# Patient Record
Sex: FEMALE | Race: WHITE | HISPANIC OR LATINO | Employment: FULL TIME | URBAN - METROPOLITAN AREA
[De-identification: names, ages, dates, MRNs, and addresses within clinical notes are randomized per-mention and may not be internally consistent; named-entity substitution may affect disease eponyms.]

---

## 2017-02-09 ENCOUNTER — OFFICE VISIT (OUTPATIENT)
Dept: NEUROLOGY | Facility: MEDICAL CENTER | Age: 52
End: 2017-02-09
Payer: COMMERCIAL

## 2017-02-09 VITALS
TEMPERATURE: 97.4 F | DIASTOLIC BLOOD PRESSURE: 76 MMHG | BODY MASS INDEX: 32.15 KG/M2 | RESPIRATION RATE: 16 BRPM | WEIGHT: 193 LBS | HEART RATE: 78 BPM | SYSTOLIC BLOOD PRESSURE: 126 MMHG | HEIGHT: 65 IN | OXYGEN SATURATION: 96 %

## 2017-02-09 DIAGNOSIS — M79.605 LUMBAR PAIN WITH RADIATION DOWN BOTH LEGS: ICD-10-CM

## 2017-02-09 DIAGNOSIS — M54.50 LUMBAR PAIN WITH RADIATION DOWN BOTH LEGS: ICD-10-CM

## 2017-02-09 DIAGNOSIS — M79.604 LUMBAR PAIN WITH RADIATION DOWN BOTH LEGS: ICD-10-CM

## 2017-02-09 PROCEDURE — 99213 OFFICE O/P EST LOW 20 MIN: CPT

## 2017-02-09 NOTE — MR AVS SNAPSHOT
"Margot Turk   2017 4:40 PM   Office Visit   MRN: [id number]    Department:  [facility]   Dept Phone:  [phone]    Description:  Female : 1965   Provider:  Magdi Sexton M.D.           Reason for Visit     Follow-Up lumbar pain      Allergies as of 2017     No Known Allergies      You were diagnosed with     Lumbar pain with radiation down both legs   [[id number]]         Vital Signs     Blood Pressure Pulse Temperature Respirations Height Weight    126/76 mmHg 78 36.3 °C (97.4 °F) 16 1.651 m (5' 5\") 87.544 kg (193 lb)    Body Mass Index Oxygen Saturation Smoking Status             32.12 kg/m2 96% Never Smoker          Basic Information     Date Of Birth Sex Race Ethnicity Preferred Language    1965 Female  or   Origin (Slovak,Omani,Iranian,Kenyan, etc) English      Your appointments     May 11, 2017  4:40 PM   Follow Up Visit with Magdi Sexton M.D.   [facility] (--)    [address] [place] [address] [address]  [facility] [id number] [phone]           You will be receiving a confirmation call a few days before your appointment from our automated call confirmation system.              Problem List              ICD-10-CM Priority Class Noted - Resolved    Obesity (BMI 30-39.9) E66.9   2015 - Present    Cirrhosis (CMS-HCC) K74.60   2015 - Present    Chronic pain G89.29   2015 - Present    Chronic left shoulder pain M25.512, G89.29   2015 - Present    Vitamin D deficiency E55.9   2015 - Present    Lipoma D17.9   2015 - Present    HOGUE (nonalcoholic steatohepatitis) K75.81   2016 - Present      Health Maintenance        Date Due Completion Dates    IMM DTaP/Tdap/Td Vaccine (1 - Tdap) 1985 ---    IMM PNEUMOCOCCAL 19-64 (ADULT) MEDIUM RISK SERIES (1 of 1 - PPSV23) 1985 ---    MAMMOGRAM 2016 (Done)    Override on 2015: Done (nl; nl hx)    IMM INFLUENZA (1) 2016 ---  "    COLONOSCOPY 9/19/2016 ---    PAP SMEAR 6/1/2018 6/1/2015 (Done)    Override on 6/1/2015: Done (nl; nl hx)            Current Immunizations     No immunizations on file.      Below and/or attached are the medications your provider expects you to take. Review all of your home medications and newly ordered medications with your provider and/or pharmacist. Follow medication instructions as directed by your provider and/or pharmacist. Please keep your medication list with you and share with your provider. Update the information when medications are discontinued, doses are changed, or new medications (including over-the-counter products) are added; and carry medication information at all times in the event of emergency situations     Allergies:  No Known Allergies          Medications  Valid as of: February 09, 2017 -  4:52 PM    Generic Name Brand Name Tablet Size Instructions for use    Gabapentin (Tab) NEURONTIN 600 MG Take 1 Tab by mouth 3 times a day.        Naproxen Sodium (Cap) Naproxen Sodium 220 MG Take  by mouth.        .                 Medicines prescribed today were sent to:     [facility] [address] [place] [address] [address] [facility] [address]    Phone: [phone] Fax: [phone]    Open 24 Hours?: Yes      Medication refill instructions:       If your prescription bottle indicates you have medication refills left, it is not necessary to call your provider’s office. Please contact your pharmacy and they will refill your medication.    If your prescription bottle indicates you do not have any refills left, you may request refills at any time through one of the following ways: The online [employer] system (except Urgent Care), by calling your provider’s office, or by asking your pharmacy to contact your provider’s office with a refill request. Medication refills are processed only during regular business hours and may not be available until the next  business day. Your provider may request additional information or to have a follow-up visit with you prior to refilling your medication.   *Please Note: Medication refills are assigned a new Rx number when refilled electronically. Your pharmacy may indicate that no refills were authorized even though a new prescription for the same medication is available at the pharmacy. Please request the medicine by name with the pharmacy before contacting your provider for a refill.           [employer] Access Code: [email or web address]  Expires: 3/11/2017  4:32 PM    [employer] [facility]  A secure, online tool to manage your health information     [employer]’s [employer]® is a secure, online tool that connects you to your personalized health information from the privacy of your home -- day or night - making it very easy for you to manage your healthcare. Once the activation process is completed, you can even access your medical information using the [employer] kinjal, which is available for free in the Apple Kinjal store or Google Play store.     [employer] [facility] provides the following levels of access (as shown below):   My Chart Features   [facility] Doctor [facility]  Specialists [facility]  Urgent  Care Non-[facility]  Primary Care  Doctor   Email your healthcare team securely and privately 24/7 X X X    Manage appointments: schedule your next appointment; view details of past/upcoming appointments X      Request prescription refills. X      View recent personal medical records, including lab and immunizations X X X X   View health record, including health history, allergies, medications X X X X   Read reports about your outpatient visits, procedures, consult and ER notes X X X X   See your discharge summary, which is a recap of your hospital and/or ER visit that includes your diagnosis, lab results, and care plan. X X       How to register for [employer]:  1. Go to  [email or web address].  2. Click on the Sign Up Now box, which takes you to the New Member Sign  Up page. You will need to provide the following information:  a. Enter your [employer] Access Code exactly as it appears at the top of this page. (You will not need to use this code after you’ve completed the sign-up process. If you do not sign up before the expiration date, you must request a new code.)   b. Enter your date of birth.   c. Enter your home email address.   d. Click Submit, and follow the next screen’s instructions.  3. Create a [employer] ID. This will be your [employer] login ID and cannot be changed, so think of one that is secure and easy to remember.  4. Create a [employer] password. You can change your password at any time.  5. Enter your Password Reset Question and Answer. This can be used at a later time if you forget your password.   6. Enter your e-mail address. This allows you to receive e-mail notifications when new information is available in [employer].  7. Click Sign Up. You can now view your health information.    For assistance activating your [employer] account, call [phone]

## 2017-02-10 NOTE — PROGRESS NOTES
Neurology Progress Note  2/9/2017      Referring MD:  Dr. Avery  Patient ID:  Name:             Margot Turk     YOB: 1965  Age:                 51 y.o.  female   MRN:               [id number]                                              Reason for Consult:      Follow-up: Back pain with radicular symptoms.    History of Present Illness:    This 51-year-old  lady was thought to have an arteriovenous malformation of the lumbar and lower thoracic cord but that could not be proved by angiography. She has significant back pain which is impairing her work. Initially thought she might have had a spinal stenosis syndrome but I reviewed the films that were not available at the last visit and it does not appear to be a significant spinal stenosis syndrome. He was started on gabapentin 600 mg which she takes when she is not working as it makes her relaxed and sleepy. However her pain has diminished considerably and she seems to be happy with the result at this point.    Review of Systems: Relates to her back pain.  Constitutional: Denies fevers, Denies weight changes  Eyes: Denies changes in vision, no eye pain  Ears/Nose/Throat/Mouth: Denies nasal congestion or sore throat   Cardiovascular: Denies chest pain, Denies palpitations   Respiratory: Denies shortness of breath , Denies cough  Gastrointestinal/Hepatic: Denies abdominal pain, nausea, vomiting, diarrhea, constipation or GI bleeding   Genitourinary: Denies dysuria or frequency  Musculoskeletal/Rheum: Denies  joint pain and swelling, No edema  Skin: Denies rash  Neurological: Denies headache, confusion, memory loss or focal weakness/parasthesias  Psychiatric: denies mood disorder   Endocrine: Britta thyroid problems  Heme/Oncology/Lymph Nodes: Denies enlarged lymph nodes, denies brusing or known bleeding disorder  All other systems were reviewed and are negative (AMA/CMS criteria)                Past Medical History:     Past Medical  "History   Diagnosis Date   • Cirrhosis    • Dental disorder      partial upper and lower   • Heart burn      back       Problems addressed this visit:    Problem List Items Addressed This Visit     None      Visit Diagnoses     Lumbar pain with radiation down both legs               Past Surgical History:   Past Surgical History   Procedure Laterality Date   • Pelviscopy       infertility   • Recovery  9/1/2016     Procedure: [id number]. RANGASWAMY-ANESTHESIA;  Surgeon: Ir-Recovery Surgery;  Location: SURGERY [facility];  Service:          Current Outpatient Medications:  Current Outpatient Prescriptions   Medication   • gabapentin (NEURONTIN) 600 MG tablet   • Naproxen Sodium (ALEVE) 220 MG Cap     No current facility-administered medications for this visit.       Medication Allergy:  No Known Allergies    Family History:  Family History   Problem Relation Age of Onset   • Other Father      kidney problem   • Cancer Neg Hx    • Diabetes Neg Hx        Social History:  Social History     Social History   • Marital Status: Single     Spouse Name: N/A   • Number of Children: N/A   • Years of Education: N/A     Occupational History   • Not on file.     Social History Main Topics   • Smoking status: Never Smoker    • Smokeless tobacco: Never Used   • Alcohol Use: No   • Drug Use: No   • Sexual Activity: Not Currently     Birth Control/ Protection: Post-Menopausal     Other Topics Concern   • Not on file     Social History Narrative       Physical Exam:  Vitals:   Filed Vitals:    02/09/17 1635   BP: 126/76   Pulse: 78   Temp: 36.3 °C (97.4 °F)   Resp: 16   Height: 1.651 m (5' 5\")   Weight: 87.544 kg (193 lb)   SpO2: 96%     General Appearance: This very pleasant  lady and exam was conducted with the help of a . She thinks that she can get by with just the gabapentin initially and even when I offered a muscle relaxant she didn't feel that that was necessary. Her back is not particularly " tender at this point and her exam shows normal strength and reflexes and sensation in the legs.  Weight/BMI: Body mass index is 32.12 kg/(m^2).      Eyes:  Normal optic discs: Yes  Visual field: Normal  Pupillary responses: Normal  Extraocular movement: Normal    Cardiovascular:  Normal carotid pulses bilaterally: Yes  RRR with no MRGs: Yes  No peripheral edema, pulses intact: Yes    Musculoskeletal:  Normal gait and station: Yes  Normal muscle strength: Yes  Normal muscle tone, no atrophy: Yes  No abnormal movements: Yes    Plan:   Since she is happy with the result able to work and not unduly sedated by the medication will keep her on the same medication for now on plan on seeing her in 3 months.    Total length of time of this visit was 20 minutes of which greater than 50% was spent counseling the patient/family and coordinating care.    Thank you for allowing me to participate in his care.    Sincerely yours,        Magdi Sexton MD, PhD

## 2017-05-11 ENCOUNTER — OFFICE VISIT (OUTPATIENT)
Dept: NEUROLOGY | Facility: MEDICAL CENTER | Age: 52
End: 2017-05-11
Payer: COMMERCIAL

## 2017-05-11 VITALS
BODY MASS INDEX: 31.99 KG/M2 | WEIGHT: 192 LBS | HEART RATE: 64 BPM | DIASTOLIC BLOOD PRESSURE: 76 MMHG | TEMPERATURE: 97.9 F | OXYGEN SATURATION: 98 % | HEIGHT: 65 IN | SYSTOLIC BLOOD PRESSURE: 120 MMHG

## 2017-05-11 DIAGNOSIS — M79.604 LUMBAR PAIN WITH RADIATION DOWN BOTH LEGS: ICD-10-CM

## 2017-05-11 DIAGNOSIS — M79.605 LUMBAR PAIN WITH RADIATION DOWN BOTH LEGS: ICD-10-CM

## 2017-05-11 DIAGNOSIS — M54.50 LUMBAR PAIN WITH RADIATION DOWN BOTH LEGS: ICD-10-CM

## 2017-05-11 PROCEDURE — 99213 OFFICE O/P EST LOW 20 MIN: CPT

## 2017-05-11 NOTE — PROGRESS NOTES
Neurology Progress Note  5/11/2017      Referring MD:  Dr. Aye Avery    Patient ID:  Name:             Margot Turk     YOB: 1965  Age:                 51 y.o.  female   MRN:               [id number]                                              Reason for Consult:      Lumbar pain    History of Present Illness:    This 51-year-old  lady was initially thought to have a significant arteriovenous malformation in the lower thoracic and lumbar area on MRI studies done at another institution. She was transferred to this institution for possible embolization of the presumed arteriovenous malformation but on injection of the presumed feeding artery no arteriovenous malformation was found. Review of the MRI side institution shows an area of tissue and lumbar region that could look like an arterial venous malformation but could also represent fatty tissue. After the procedure was terminated she was seen here and started on gabapentin to control her disc discomfort she had and after several months she has no pain at this time except when she turns to her right and she has some discomfort in the paraspinal muscles in the lower thoracic region.    Review of Systems: Currently unremarkable.  Constitutional: Denies fevers, Denies weight changes  Eyes: Denies changes in vision, no eye pain  Ears/Nose/Throat/Mouth: Denies nasal congestion or sore throat   Cardiovascular: Denies chest pain, Denies palpitations   Respiratory: Denies shortness of breath , Denies cough  Gastrointestinal/Hepatic: Denies abdominal pain, nausea, vomiting, diarrhea, constipation or GI bleeding   Genitourinary: Denies dysuria or frequency  Musculoskeletal/Rheum: Denies  joint pain and swelling, No edema  Skin: Denies rash  Neurological: Denies headache, confusion, memory loss or focal weakness/parasthesias  Psychiatric: denies mood disorder   Endocrine: Britta thyroid problems  Heme/Oncology/Lymph Nodes: Denies enlarged  "lymph nodes, denies brusing or known bleeding disorder  All other systems were reviewed and are negative (AMA/CMS criteria)                Past Medical History:     Past Medical History   Diagnosis Date   • Cirrhosis    • Dental disorder      partial upper and lower   • Heart burn      back       Problems addressed this visit:    Problem List Items Addressed This Visit     None      Visit Diagnoses     Lumbar pain with radiation down both legs               Past Surgical History:   Past Surgical History   Procedure Laterality Date   • Pelviscopy       infertility   • Recovery  9/1/2016     Procedure: [id number]. RANGASWAMY-ANESTHESIA;  Surgeon: Ir-Recovery Surgery;  Location: SURGERY [facility];  Service:          Current Outpatient Medications:  Current Outpatient Prescriptions   Medication   • gabapentin (NEURONTIN) 600 MG tablet   • Naproxen Sodium (ALEVE) 220 MG Cap     No current facility-administered medications for this visit.       Medication Allergy:  No Known Allergies    Family History:  Family History   Problem Relation Age of Onset   • Other Father      kidney problem   • Cancer Neg Hx    • Diabetes Neg Hx        Social History:  Social History     Social History   • Marital Status: Single     Spouse Name: N/A   • Number of Children: N/A   • Years of Education: N/A     Occupational History   • Not on file.     Social History Main Topics   • Smoking status: Never Smoker    • Smokeless tobacco: Never Used   • Alcohol Use: No   • Drug Use: No   • Sexual Activity: Not Currently     Birth Control/ Protection: Post-Menopausal     Other Topics Concern   • Not on file     Social History Narrative       Physical Exam:  Vitals:   Filed Vitals:    05/11/17 1621   BP: 120/76   Pulse: 64   Temp: 36.6 °C (97.9 °F)   Height: 1.651 m (5' 5\")   Weight: 87.091 kg (192 lb)   SpO2: 98%     General Appearance: He is well-developed well-nourished  lady who is quite pleasant. Examining the lower thoracic " region does reveal an area in the muscle it's tender but not obviously inflamed and feels more like a muscle spasm or fibromyalgia-like area and does not involve the lumbar spine area which appears to be normal on palpation. Her lower extremity exam is unremarkable as is the rest of her neurological exam.  Weight/BMI: Body mass index is 31.95 kg/(m^2).      Eyes:  Normal optic discs: Yes  Visual field: Normal  Pupillary responses: Normal  Extraocular movement: Normal    Cardiovascular:  Normal carotid pulses bilaterally: Yes  RRR with no MRGs: Yes  No peripheral edema, pulses intact: Yes    Musculoskeletal:  Normal gait and station: Yes  Normal muscle strength: Yes  Normal muscle tone, no atrophy: Yes  No abnormal movements: Yes    Plan:   In reviewing the MRI with her shoulder the area of concern and the fact that that was evaluated by angiographic technique and found not to be an arteriovenous malformation and probably represents some fatty tissue in the area. She is happy with the result except for the cost that she had cover a portion of the very expensive procedure. The localized area could probably be treated with physical means such as heat massage possibly trigger point injections could be arranged by her family physician. We'll see her again if needed if further problems arise she should call us.    Total length of time of this visit was 20 minutes of which greater than 50% was spent counseling the patient/family and coordinating care.    Thank you for allowing me to participate in his care.    Sincerely yours,        Magdi Sexton MD, PhD

## 2018-12-05 ENCOUNTER — HOSPITAL ENCOUNTER (EMERGENCY)
Dept: HOSPITAL 8 - ED | Age: 53
Discharge: HOME | End: 2018-12-05
Payer: COMMERCIAL

## 2018-12-05 VITALS — DIASTOLIC BLOOD PRESSURE: 95 MMHG | SYSTOLIC BLOOD PRESSURE: 149 MMHG

## 2018-12-05 VITALS — WEIGHT: 201.28 LBS | BODY MASS INDEX: 39.52 KG/M2 | HEIGHT: 60 IN

## 2018-12-05 DIAGNOSIS — S61.217A: Primary | ICD-10-CM

## 2018-12-05 PROCEDURE — 99281 EMR DPT VST MAYX REQ PHY/QHP: CPT

## 2019-03-12 ENCOUNTER — OFFICE VISIT (OUTPATIENT)
Dept: MEDICAL GROUP | Facility: MEDICAL CENTER | Age: 54
End: 2019-03-12
Payer: COMMERCIAL

## 2019-03-12 VITALS
SYSTOLIC BLOOD PRESSURE: 138 MMHG | RESPIRATION RATE: 14 BRPM | BODY MASS INDEX: 34.23 KG/M2 | TEMPERATURE: 97.6 F | OXYGEN SATURATION: 97 % | HEIGHT: 65 IN | HEART RATE: 82 BPM | DIASTOLIC BLOOD PRESSURE: 74 MMHG | WEIGHT: 205.47 LBS

## 2019-03-12 DIAGNOSIS — Z13.220 SCREENING, LIPID: ICD-10-CM

## 2019-03-12 DIAGNOSIS — Z13.29 SCREENING FOR THYROID DISORDER: ICD-10-CM

## 2019-03-12 DIAGNOSIS — E55.9 VITAMIN D DEFICIENCY: ICD-10-CM

## 2019-03-12 DIAGNOSIS — Z12.31 ENCOUNTER FOR SCREENING MAMMOGRAM FOR BREAST CANCER: ICD-10-CM

## 2019-03-12 DIAGNOSIS — K74.60 CIRRHOSIS OF LIVER WITHOUT ASCITES, UNSPECIFIED HEPATIC CIRRHOSIS TYPE (HCC): ICD-10-CM

## 2019-03-12 DIAGNOSIS — K75.81 NASH (NONALCOHOLIC STEATOHEPATITIS): ICD-10-CM

## 2019-03-12 DIAGNOSIS — Z12.11 COLON CANCER SCREENING: ICD-10-CM

## 2019-03-12 DIAGNOSIS — Z28.21 INFLUENZA VACCINATION DECLINED: ICD-10-CM

## 2019-03-12 DIAGNOSIS — R20.0 NUMBNESS OF FINGER: ICD-10-CM

## 2019-03-12 PROCEDURE — 99214 OFFICE O/P EST MOD 30 MIN: CPT | Performed by: NURSE PRACTITIONER

## 2019-03-12 ASSESSMENT — PATIENT HEALTH QUESTIONNAIRE - PHQ9: CLINICAL INTERPRETATION OF PHQ2 SCORE: 0

## 2019-03-12 NOTE — LETTER
March 12, 2019         Patient: Margot Turk   YOB: 1965   Date of Visit: 3/12/2019           To Whom it May Concern:    Margot Turk was seen in my clinic on 3/12/2019. She continues to have numbness in her LEFT pinky finger that began and has persisted from her finger laceration 11/26/19.  She has no other symptoms that would be concerning for an underlying chronic illness causing this symptom.    If you have any questions or concerns, please don't hesitate to call.        Sincerely,           DICK Mazariegos  Electronically Signed

## 2019-03-12 NOTE — PROGRESS NOTES
"CC: Hand Numbness (related to work injury 11/26/18. After getting 3 stitches, she still has numbness. Work comp sent to her PCP to determine if the numbness is underlying condition or due to work comp)        HPI:     Margot presents today for the following:    Last seen approximately 3 years ago-denies any changes in her health since then      1. Numbness of finger  Here primarily to follow-up regarding some numbness on the distal aspect of her right pinky finger.  States on November 26 she had a deep laceration.  Sutures were placed in the [facility] ER.  She went back to days later and had them removed.  States there was a thick scar which she \"cut off\" afterwards.  She states ever since she was in the ER she is been unable to have normal sensation on the distal aspect of this finger.  States she can tell something is touching it but it sort of numb and vague feeling.  [facility] however per the paperwork she brought in today there was a comment that she was there related to complaints of chest pain when her pinky finger was touched and they wanted her to be seen by PCP to rule out other etiology.  Patient denies actual chest pain.  States when something touches the finger which is not necessarily painful just very bothersome she will have a sensation of transient chest pain.  Suspect this is more of a reaction to pain.  She states she does not feel pain that moves from the finger up into the arm.  It is localized numbness and discomfort.    Per paperwork they did address Tdap in the ER    2. Cirrhosis of liver without ascites, unspecified hepatic cirrhosis type (HCC)/HOGUE (nonalcoholic steatohepatitis)  Patient states she last saw Dr. Hyde may be 2 or 3 years ago.  She states she had a liver biopsy which confirmed she did not have cirrhosis however she did have [place].  Describes \"inflammation from fat in the liver\".  She is been lost to follow-up.    4. Vitamin D deficiency  History of " "vitamin D deficiency.  Not currently taking vitamins for it        No current outpatient prescriptions on file.     No current facility-administered medications for this visit.      Social History   Substance Use Topics   • Smoking status: Never Smoker   • Smokeless tobacco: Never Used   • Alcohol use No     I reviewed patients allergies, problem list and medications today in EPIC.    ROS: Any/all pertinent positives listed in the HPI, otherwise all others reviewed are negative today.      /74 (BP Location: Right arm, Patient Position: Sitting, BP Cuff Size: Adult)   Pulse 82   Temp 36.4 °C (97.6 °F) (Temporal)   Resp 14   Ht 1.651 m (5' 5\")   Wt 93.2 kg (205 lb 7.5 oz)   SpO2 97%   BMI 34.19 kg/m²     Exam:    Gen: Alert and oriented, No apparent distress. WDWN  Psych: A+Ox3, normal affect and mood  Skin: Warm, dry and intact. Good turgor   No rashes in visible areas.  Eye: Conjunctiva clear, lids normal  ENMT: Lips without lesions, fair dentition.   Neck: No Lymphadenopathy, Thyromegaly, Bruits.   Trachea midline, no masses  Lungs: Clear to auscultation bilaterally, no rales or rhonchi   Unlabored respiratory effort.   CV: Regular rate and rhythm, S1, S2. No murmurs.   No Edema  Right hand pinky finger shows no visible abnormality.  She does report numbness from the PIP distally down to the fingertip.  Normal at the base of the pinky.  Laceration was around the PIP.      Assessment and Plan.   53 y.o. female with the following issues.    1. Numbness of finger  Stable.  Old well-healed incision with no visible scar abnormality.  She does have localized numbness that both began and persisted since she had a cut on 11/26.  Describes it as being specifically from the PIP down to the fingertip.  Does not have any associated hand, wrist, arm, neck discomfort.  Note given to bring to Workmen's Comp.    2. Cirrhosis of liver without ascites, unspecified hepatic cirrhosis type (HCC)/ HOGUE (nonalcoholic " steatohepatitis)  Per patient cirrhosis is no longer an active diagnoses.  I will request records and adjust her problem list at that time.  When I have records I will be able to determine if she needs to have continued follow-up.  Labs ordered as below.  - Comp Metabolic Panel; Future  - CBC WITH DIFFERENTIAL; Future    4. Vitamin D deficiency  Ordered  - VITAMIN D,25 HYDROXY; Future    5. Screening, lipid  Ordered  - Lipid Profile; Future    6. Screening for thyroid disorder  Ordered  - TSH; Future    7. Influenza vaccination declined      8. Encounter for screening mammogram for breast cancer  Ordered.  Needs to be referred to [facility]  - MA-SCREEN MAMMO W/CAD-BILAT; Future  - REFERRAL TO OB/GYN    9. Colon cancer screening  Per patient she had a colon cancer screening in 2017 or 2016 with Dr. Hyde.  We will request these records.

## 2019-03-12 NOTE — LETTER
[employer] [facility]  CHON Mazariegos.  [address]  [place] [address]  Fax: [phone]   Authorization for Release/Disclosure of   Protected Health Information   Name: MARGOT BARRIENTOS : 1965 SSN: [id number]   Address: [address] [place] [address] [place] [address] Phone:    [phone] (home)    I authorize the entity listed below to release/disclose the PHI below to:   [facility]/DICK Mazariegos and DICK Mazariegos   Provider or Entity Name:     Address   City, State, Zip   Phone:      Fax:     Reason for request: continuity of care   Information to be released:    [  ] LAST COLONOSCOPY,  including any PATH REPORT and follow-up  [  ] LAST FIT/COLOGUARD RESULT [  ] LAST DEXA  [  ] LAST MAMMOGRAM  [  ] LAST PAP  [  ] LAST LABS [  ] RETINA EXAM REPORT  [  ] IMMUNIZATION RECORDS  [  ] Release all info      [  ] Check here and initial the line next to each item to release ALL health information INCLUDING  _____ Care and treatment for drug and / or alcohol abuse  _____ HIV testing, infection status, or AIDS  _____ Genetic Testing    DATES OF SERVICE OR TIME PERIOD TO BE DISCLOSED: _____________  I understand and acknowledge that:  * This Authorization may be revoked at any time by you in writing, except if your health information has already been used or disclosed.  * Your health information that will be used or disclosed as a result of you signing this authorization could be re-disclosed by the recipient. If this occurs, your re-disclosed health information may no longer be protected by State or Federal laws.  * You may refuse to sign this Authorization. Your refusal will not affect your ability to obtain treatment.  * This Authorization becomes effective upon signing and will  on (date) __________.      If no date is indicated, this Authorization will  one (1) year from the signature date.    Name: Margot Barrientos    Signature:   Date:     3/12/2019       PLEASE FAX  REQUESTED RECORDS BACK TO: [phone]

## 2019-03-19 ENCOUNTER — HOSPITAL ENCOUNTER (OUTPATIENT)
Dept: LAB | Facility: MEDICAL CENTER | Age: 54
End: 2019-03-19
Attending: NURSE PRACTITIONER
Payer: COMMERCIAL

## 2019-03-19 DIAGNOSIS — Z13.29 SCREENING FOR THYROID DISORDER: ICD-10-CM

## 2019-03-19 DIAGNOSIS — Z13.220 SCREENING, LIPID: ICD-10-CM

## 2019-03-19 DIAGNOSIS — E55.9 VITAMIN D DEFICIENCY: ICD-10-CM

## 2019-03-19 DIAGNOSIS — K74.60 CIRRHOSIS OF LIVER WITHOUT ASCITES, UNSPECIFIED HEPATIC CIRRHOSIS TYPE (HCC): ICD-10-CM

## 2019-03-19 DIAGNOSIS — K75.81 NASH (NONALCOHOLIC STEATOHEPATITIS): ICD-10-CM

## 2019-03-19 LAB
25(OH)D3 SERPL-MCNC: 13 NG/ML (ref 30–100)
ALBUMIN SERPL BCP-MCNC: 4.7 G/DL (ref 3.2–4.9)
ALBUMIN/GLOB SERPL: 1.2 G/DL
ALP SERPL-CCNC: 110 U/L (ref 30–99)
ALT SERPL-CCNC: 23 U/L (ref 2–50)
ANION GAP SERPL CALC-SCNC: 5 MMOL/L (ref 0–11.9)
AST SERPL-CCNC: 17 U/L (ref 12–45)
BASOPHILS # BLD AUTO: 0.6 % (ref 0–1.8)
BASOPHILS # BLD: 0.03 K/UL (ref 0–0.12)
BILIRUB SERPL-MCNC: 0.8 MG/DL (ref 0.1–1.5)
BUN SERPL-MCNC: 16 MG/DL (ref 8–22)
CALCIUM SERPL-MCNC: 9.9 MG/DL (ref 8.5–10.5)
CHLORIDE SERPL-SCNC: 103 MMOL/L (ref 96–112)
CHOLEST SERPL-MCNC: 195 MG/DL (ref 100–199)
CO2 SERPL-SCNC: 29 MMOL/L (ref 20–33)
CREAT SERPL-MCNC: 0.56 MG/DL (ref 0.5–1.4)
EOSINOPHIL # BLD AUTO: 0.46 K/UL (ref 0–0.51)
EOSINOPHIL NFR BLD: 8.8 % (ref 0–6.9)
ERYTHROCYTE [DISTWIDTH] IN BLOOD BY AUTOMATED COUNT: 44.3 FL (ref 35.9–50)
FASTING STATUS PATIENT QL REPORTED: NORMAL
GLOBULIN SER CALC-MCNC: 4 G/DL (ref 1.9–3.5)
GLUCOSE SERPL-MCNC: 95 MG/DL (ref 65–99)
HCT VFR BLD AUTO: 45.9 % (ref 37–47)
HDLC SERPL-MCNC: 62 MG/DL
HGB BLD-MCNC: 15.1 G/DL (ref 12–16)
IMM GRANULOCYTES # BLD AUTO: 0.02 K/UL (ref 0–0.11)
IMM GRANULOCYTES NFR BLD AUTO: 0.4 % (ref 0–0.9)
LDLC SERPL CALC-MCNC: 112 MG/DL
LYMPHOCYTES # BLD AUTO: 1.64 K/UL (ref 1–4.8)
LYMPHOCYTES NFR BLD: 31.3 % (ref 22–41)
MCH RBC QN AUTO: 31 PG (ref 27–33)
MCHC RBC AUTO-ENTMCNC: 32.9 G/DL (ref 33.6–35)
MCV RBC AUTO: 94.3 FL (ref 81.4–97.8)
MONOCYTES # BLD AUTO: 0.31 K/UL (ref 0–0.85)
MONOCYTES NFR BLD AUTO: 5.9 % (ref 0–13.4)
NEUTROPHILS # BLD AUTO: 2.78 K/UL (ref 2–7.15)
NEUTROPHILS NFR BLD: 53 % (ref 44–72)
NRBC # BLD AUTO: 0 K/UL
NRBC BLD-RTO: 0 /100 WBC
PLATELET # BLD AUTO: 105 K/UL (ref 164–446)
PMV BLD AUTO: 13.3 FL (ref 9–12.9)
POTASSIUM SERPL-SCNC: 4.1 MMOL/L (ref 3.6–5.5)
PROT SERPL-MCNC: 8.7 G/DL (ref 6–8.2)
RBC # BLD AUTO: 4.87 M/UL (ref 4.2–5.4)
SODIUM SERPL-SCNC: 137 MMOL/L (ref 135–145)
TRIGL SERPL-MCNC: 107 MG/DL (ref 0–149)
TSH SERPL DL<=0.005 MIU/L-ACNC: 2.23 UIU/ML (ref 0.38–5.33)
WBC # BLD AUTO: 5.2 K/UL (ref 4.8–10.8)

## 2019-03-19 PROCEDURE — 80053 COMPREHEN METABOLIC PANEL: CPT

## 2019-03-19 PROCEDURE — 82306 VITAMIN D 25 HYDROXY: CPT

## 2019-03-19 PROCEDURE — 84443 ASSAY THYROID STIM HORMONE: CPT

## 2019-03-19 PROCEDURE — 80061 LIPID PANEL: CPT

## 2019-03-19 PROCEDURE — 36415 COLL VENOUS BLD VENIPUNCTURE: CPT

## 2019-03-19 PROCEDURE — 85025 COMPLETE CBC W/AUTO DIFF WBC: CPT

## 2019-03-20 DIAGNOSIS — D69.6 THROMBOCYTOPENIA (HCC): ICD-10-CM

## 2023-05-26 ENCOUNTER — HOSPITAL ENCOUNTER (OUTPATIENT)
Facility: MEDICAL CENTER | Age: 58
End: 2023-05-26
Attending: PLASTIC SURGERY
Payer: COMMERCIAL

## 2023-05-26 LAB — PATHOLOGY CONSULT NOTE: NORMAL

## 2023-05-26 PROCEDURE — 88304 TISSUE EXAM BY PATHOLOGIST: CPT | Mod: 59
